# Patient Record
Sex: FEMALE | Race: WHITE
[De-identification: names, ages, dates, MRNs, and addresses within clinical notes are randomized per-mention and may not be internally consistent; named-entity substitution may affect disease eponyms.]

---

## 2019-08-30 ENCOUNTER — HOSPITAL ENCOUNTER (OUTPATIENT)
Dept: HOSPITAL 76 - EMS | Age: 44
Discharge: ANOTHER HOSPITAL | End: 2019-08-30
Attending: SURGERY
Payer: COMMERCIAL

## 2019-08-30 ENCOUNTER — HOSPITAL ENCOUNTER (EMERGENCY)
Dept: HOSPITAL 76 - ED | Age: 44
LOS: 1 days | Discharge: HOME | End: 2019-08-31
Payer: COMMERCIAL

## 2019-08-30 DIAGNOSIS — E11.65: ICD-10-CM

## 2019-08-30 DIAGNOSIS — R11.2: Primary | ICD-10-CM

## 2019-08-30 DIAGNOSIS — F10.129: Primary | ICD-10-CM

## 2019-08-30 DIAGNOSIS — Z79.84: ICD-10-CM

## 2019-08-30 DIAGNOSIS — R42: ICD-10-CM

## 2019-08-30 DIAGNOSIS — E86.0: ICD-10-CM

## 2019-08-30 LAB
BASOPHILS NFR BLD AUTO: 0 10^3/UL (ref 0–0.1)
BASOPHILS NFR BLD AUTO: 0.4 %
EOSINOPHIL # BLD AUTO: 0.2 10^3/UL (ref 0–0.7)
EOSINOPHIL NFR BLD AUTO: 2.3 %
ERYTHROCYTE [DISTWIDTH] IN BLOOD BY AUTOMATED COUNT: 18.4 % (ref 12–15)
HGB UR QL STRIP: 11.4 G/DL (ref 12–16)
LYMPHOCYTES # SPEC AUTO: 2.2 10^3/UL (ref 1.5–3.5)
LYMPHOCYTES NFR BLD AUTO: 28 %
MCH RBC QN AUTO: 24.4 PG (ref 27–31)
MCHC RBC AUTO-ENTMCNC: 30.2 G/DL (ref 32–36)
MCV RBC AUTO: 80.6 FL (ref 81–99)
MONOCYTES # BLD AUTO: 0.7 10^3/UL (ref 0–1)
MONOCYTES NFR BLD AUTO: 8.5 %
NEUTROPHILS # BLD AUTO: 4.7 10^3/UL (ref 1.5–6.6)
NEUTROPHILS # SNV AUTO: 7.8 X10^3/UL (ref 4.8–10.8)
NEUTROPHILS NFR BLD AUTO: 60.5 %
PDW BLD AUTO: 9.9 FL (ref 7.9–10.8)
PLATELET # BLD: 259 10^3/UL (ref 130–450)
RBC MAR: 4.68 10^6/UL (ref 4.2–5.4)

## 2019-08-30 PROCEDURE — 83690 ASSAY OF LIPASE: CPT

## 2019-08-30 PROCEDURE — 99284 EMERGENCY DEPT VISIT MOD MDM: CPT

## 2019-08-30 PROCEDURE — 80320 DRUG SCREEN QUANTALCOHOLS: CPT

## 2019-08-30 PROCEDURE — 80053 COMPREHEN METABOLIC PANEL: CPT

## 2019-08-30 PROCEDURE — 85025 COMPLETE CBC W/AUTO DIFF WBC: CPT

## 2019-08-30 PROCEDURE — 81003 URINALYSIS AUTO W/O SCOPE: CPT

## 2019-08-30 PROCEDURE — 87086 URINE CULTURE/COLONY COUNT: CPT

## 2019-08-30 PROCEDURE — 96361 HYDRATE IV INFUSION ADD-ON: CPT

## 2019-08-30 PROCEDURE — 99282 EMERGENCY DEPT VISIT SF MDM: CPT

## 2019-08-30 PROCEDURE — 81001 URINALYSIS AUTO W/SCOPE: CPT

## 2019-08-30 PROCEDURE — 96365 THER/PROPH/DIAG IV INF INIT: CPT

## 2019-08-30 PROCEDURE — 81025 URINE PREGNANCY TEST: CPT

## 2019-08-30 PROCEDURE — 80306 DRUG TEST PRSMV INSTRMNT: CPT

## 2019-08-30 PROCEDURE — 36415 COLL VENOUS BLD VENIPUNCTURE: CPT

## 2019-08-30 NOTE — ED PHYSICIAN DOCUMENTATION
PD HPI NVD





- Stated complaint


Stated Complaint: ETOH





- Chief complaint


Chief Complaint: Abd Pain





- History obtained from


History obtained from: Patient, EMS





- History of Present Illness


Timing - onset: Today


Timing - duration: Minutes


Timing - details: Abrupt onset, Still present


Associated symptoms: Dizzy, Near syncope / syncope


Contributing factors: Alcohol use


Improved by: Laying still


Similar symptoms before: Has not had sx before


Recently seen: Not recently seen





- Additonal information


Additional information: 





Previously well 44-year-old female diabetic was at the [facility] when she had to glasses of cider and she did have some shots of 

Juliana's as well.  She has become ill with vomiting she is been in the 

bathroom for about 45 minutes prior to the ambulance being summoned.





Review of Systems


Constitutional: denies: Fever


Eyes: denies: Decreased vision


Ears: denies: Ear pain


Nose: denies: Congestion


Throat: denies: Sore throat


Cardiac: denies: Chest pain / pressure, Palpitations


Respiratory: denies: Dyspnea, Cough


GI: reports: Nausea, Vomiting.  denies: Abdominal Pain


: denies: Dysuria, Frequency


Skin: denies: Rash


Musculoskeletal: reports: Back pain.  denies: Neck pain, Extremity pain


Neurologic: reports: Generalized weakness.  denies: Focal weakness, Numbness





PD PAST MEDICAL HISTORY





- Present Medications


Home Medications: 


                                Ambulatory Orders











 Medication  Instructions  Recorded  Confirmed


 


Amitriptyline [Elavil] 10 mg PO HS 08/30/19 08/30/19


 


Escitalopram [Lexapro] 20 mg PO DAILY 08/30/19 08/30/19


 


Glipizide 5 mg PO DAILY 08/30/19 08/30/19


 


Lisinopril 20 mg PO DAILY 08/30/19 08/30/19


 


Metformin HCl [Glucophage Xr] 750 mg PO BID 08/30/19 08/30/19


 


Simvastatin 20 mg PO QPM 08/30/19 08/30/19














- Allergies


Allergies/Adverse Reactions: 


                                    Allergies











Allergy/AdvReac Type Severity Reaction Status Date / Time


 


morphine Allergy  Emesis Verified 08/30/19 23:14














PD ED PE NORMAL





- Vitals


Vital signs reviewed: Yes (normal )





- General


General: Alert and oriented X 3, Well developed/nourished, Other (nauseous 

appearing female who is withdrawn & pale, answers questions appropriately )





- HEENT


HEENT: Atraumatic, PERRL, EOMI





- Neck


Neck: Supple, no meningeal sign, No bony TTP





- Cardiac


Cardiac: RRR, No murmur





- Respiratory


Respiratory: No respiratory distress, Clear bilaterally





- Abdomen


Abdomen: Normal bowel sounds, Soft, Non tender, Non distended, No organomegaly





- Back


Back: No CVA TTP, No spinal TTP





- Derm


Derm: Normal color, Warm and dry, No rash





- Extremities


Extremities: No deformity, No edema





- Neuro


Neuro: Alert and oriented X 3, CNs 2-12 intact, No motor deficit, No sensory 

deficit, Normal speech


Eye Opening: Spontaneous


Motor: Obeys Commands


Verbal: Oriented


GCS Score: 15





- Psych


Psych: Normal mood, Normal affect





Results





- Vitals


Vitals: 


                               Vital Signs - 24 hr











  08/30/19 08/31/19 08/31/19





  23:08 00:33 02:36


 


Temperature 36.4 C L  


 


Heart Rate 80 97 92


 


Respiratory 16 16 16





Rate   


 


Blood Pressure 126/60 119/77 115/71


 


O2 Saturation 98 95 95








                                     Oxygen











O2 Source                      Room air

















- Labs


Labs: 


                                Laboratory Tests











  08/30/19 08/30/19 08/31/19





  23:30 23:30 01:33


 


WBC  7.8  


 


RBC  4.68  


 


Hgb  11.4 L  


 


Hct  37.7  


 


MCV  80.6 L  


 


MCH  24.4 L  


 


MCHC  30.2 L  


 


RDW  18.4 H  


 


Plt Count  259  


 


MPV  9.9  


 


Neut # (Auto)  4.7  


 


Lymph # (Auto)  2.2  


 


Mono # (Auto)  0.7  


 


Eos # (Auto)  0.2  


 


Baso # (Auto)  0.0  


 


Absolute Nucleated RBC  0.00  


 


Nucleated RBC %  0.0  


 


Sodium   137 


 


Potassium   3.9 


 


Chloride   103 


 


Carbon Dioxide   25 


 


Anion Gap   9.0 


 


BUN   9 


 


Creatinine   0.5 


 


Estimated GFR (MDRD)   135 


 


Glucose   271 H 


 


Calcium   8.6 


 


Total Bilirubin   < 0.2 L 


 


AST   39 


 


ALT   55 


 


Alkaline Phosphatase   81 


 


Total Protein   8.5 H 


 


Albumin   3.9 


 


Globulin   4.6 H 


 


Albumin/Globulin Ratio   0.8 L 


 


Lipase   35 


 


Urine Color   


 


Urine Clarity   


 


Urine pH   


 


Ur Specific Gravity   


 


Urine Protein   


 


Urine Glucose (UA)   


 


Urine Ketones   


 


Urine Occult Blood   


 


Urine Nitrite   


 


Urine Bilirubin   


 


Urine Urobilinogen   


 


Ur Leukocyte Esterase   


 


Urine RBC   


 


Urine WBC   


 


Ur Squamous Epith Cells   


 


Urine Bacteria   


 


Ur Microscopic Review   


 


Urine Culture Comments   


 


Urine HCG, Qual   


 


Urine Opiates Screen    NEGATIVE


 


Ur Oxycodone Screen    NEGATIVE


 


Urine Methadone Screen    NEGATIVE


 


Ur Propoxyphene Screen    NEGATIVE


 


Ur Barbiturates Screen    NEGATIVE


 


Ur Tricyclics Screen    POSITIVE H


 


Ur Phencyclidine Scrn    NEGATIVE


 


Ur Amphetamine Screen    NEGATIVE


 


U Methamphetamines Scrn    NEGATIVE


 


U Benzodiazepines Scrn    NEGATIVE


 


Urine Cocaine Screen    NEGATIVE


 


U Cannabinoids Screen    NEGATIVE


 


Ethyl Alcohol   140.2 














  08/31/19





  01:33


 


WBC 


 


RBC 


 


Hgb 


 


Hct 


 


MCV 


 


MCH 


 


MCHC 


 


RDW 


 


Plt Count 


 


MPV 


 


Neut # (Auto) 


 


Lymph # (Auto) 


 


Mono # (Auto) 


 


Eos # (Auto) 


 


Baso # (Auto) 


 


Absolute Nucleated RBC 


 


Nucleated RBC % 


 


Sodium 


 


Potassium 


 


Chloride 


 


Carbon Dioxide 


 


Anion Gap 


 


BUN 


 


Creatinine 


 


Estimated GFR (MDRD) 


 


Glucose 


 


Calcium 


 


Total Bilirubin 


 


AST 


 


ALT 


 


Alkaline Phosphatase 


 


Total Protein 


 


Albumin 


 


Globulin 


 


Albumin/Globulin Ratio 


 


Lipase 


 


Urine Color  YELLOW


 


Urine Clarity  CLEAR


 


Urine pH  5.5


 


Ur Specific Gravity  1.015


 


Urine Protein  100 H


 


Urine Glucose (UA)  >=1000 H


 


Urine Ketones  NEGATIVE


 


Urine Occult Blood  TRACE-INTA


 


Urine Nitrite  NEGATIVE


 


Urine Bilirubin  NEGATIVE


 


Urine Urobilinogen  0.2 (NORMAL)


 


Ur Leukocyte Esterase  NEGATIVE


 


Urine RBC  0-5


 


Urine WBC  0-3


 


Ur Squamous Epith Cells  MOD Squamous H


 


Urine Bacteria  Rare


 


Ur Microscopic Review  INDICATED


 


Urine Culture Comments  NOT INDICATED


 


Urine HCG, Qual  NEGATIVE


 


Urine Opiates Screen 


 


Ur Oxycodone Screen 


 


Urine Methadone Screen 


 


Ur Propoxyphene Screen 


 


Ur Barbiturates Screen 


 


Ur Tricyclics Screen 


 


Ur Phencyclidine Scrn 


 


Ur Amphetamine Screen 


 


U Methamphetamines Scrn 


 


U Benzodiazepines Scrn 


 


Urine Cocaine Screen 


 


U Cannabinoids Screen 


 


Ethyl Alcohol 














Procedures





- IVC sono (time)


  ** 2315


Bedside IVC sono: IVC measures (cm) (0.99), Dehydration (est 1-2 liter deficit)





PD MEDICAL DECISION MAKING





- ED course


Complexity details: reviewed results, re-evaluated patient, considered 

differential, d/w patient, d/w family


ED course: 





44-year-old female comes from a music festival this evening where she has had 2 

glasses of the cider which may be stronger than anticipated.  She has developed 

nausea and vomiting and she is dehydrated.  She appears intoxicated. She has a 

history of diabetes and her blood glucose is elevated. 





She is hydrated here in the emergency department her vomiting resolves she does 

feel like she had more to drink than she thought and she is used alcohol in the 

form of 2-3 drinks once or twice per week.





Departure





- Departure


Disposition: 01 Home, Self Care


Clinical Impression: 


Alcohol intoxication


Qualifiers:


 Complication of substance-induced condition: uncomplicated Qualified Code(s): 

F10.920 - Alcohol use, unspecified with intoxication, uncomplicated





Condition: Stable


Instructions:  ED Alcohol Intoxication


Follow-Up: 


Your, doctor [Other]

## 2019-08-31 VITALS — DIASTOLIC BLOOD PRESSURE: 71 MMHG | SYSTOLIC BLOOD PRESSURE: 115 MMHG

## 2019-08-31 LAB
ALBUMIN DIAFP-MCNC: 3.9 G/DL (ref 3.2–5.5)
ALBUMIN/GLOB SERPL: 0.8 {RATIO} (ref 1–2.2)
ALP SERPL-CCNC: 81 IU/L (ref 42–121)
ALT SERPL W P-5'-P-CCNC: 55 IU/L (ref 10–60)
AMPHET UR QL SCN: NEGATIVE
ANION GAP SERPL CALCULATED.4IONS-SCNC: 9 MMOL/L (ref 6–13)
AST SERPL W P-5'-P-CCNC: 39 IU/L (ref 10–42)
BENZODIAZ UR QL SCN: NEGATIVE
BILIRUB BLD-MCNC: < 0.2 MG/DL (ref 0.2–1)
BUN SERPL-MCNC: 9 MG/DL (ref 6–20)
CALCIUM UR-MCNC: 8.6 MG/DL (ref 8.5–10.3)
CHLORIDE SERPL-SCNC: 103 MMOL/L (ref 101–111)
CLARITY UR REFRACT.AUTO: CLEAR
CO2 SERPL-SCNC: 25 MMOL/L (ref 21–32)
COCAINE UR-SCNC: NEGATIVE UMOL/L
CREAT SERPLBLD-SCNC: 0.5 MG/DL (ref 0.4–1)
GFRSERPLBLD MDRD-ARVRAT: 135 ML/MIN/{1.73_M2} (ref 89–?)
GLOBULIN SER-MCNC: 4.6 G/DL (ref 2.1–4.2)
GLUCOSE SERPL-MCNC: 271 MG/DL (ref 70–100)
GLUCOSE UR QL STRIP.AUTO: >=1000 MG/DL
HCG UR QL: NEGATIVE
KETONES UR QL STRIP.AUTO: NEGATIVE MG/DL
LIPASE SERPL-CCNC: 35 U/L (ref 22–51)
METHADONE UR QL SCN: NEGATIVE
METHAMPHET UR QL SCN: NEGATIVE
NITRITE UR QL STRIP.AUTO: NEGATIVE
OPIATES UR QL SCN: NEGATIVE
PH UR STRIP.AUTO: 5.5 PH (ref 5–7.5)
PROT SPEC-MCNC: 8.5 G/DL (ref 6.7–8.2)
PROT UR STRIP.AUTO-MCNC: 100 MG/DL
RBC # UR STRIP.AUTO: (no result) /UL
RBC # URNS HPF: (no result) /HPF (ref 0–5)
SODIUM SERPLBLD-SCNC: 137 MMOL/L (ref 135–145)
SP GR UR STRIP.AUTO: 1.01 (ref 1–1.03)
SQUAMOUS URNS QL MICRO: (no result)
UROBILINOGEN UR QL STRIP.AUTO: (no result) E.U./DL
UROBILINOGEN UR STRIP.AUTO-MCNC: NEGATIVE MG/DL
VOLATILE DRUGS POS SERPL SCN: (no result)